# Patient Record
Sex: FEMALE | Race: WHITE | URBAN - METROPOLITAN AREA
[De-identification: names, ages, dates, MRNs, and addresses within clinical notes are randomized per-mention and may not be internally consistent; named-entity substitution may affect disease eponyms.]

---

## 2020-09-24 ENCOUNTER — APPOINTMENT (OUTPATIENT)
Dept: LAB | Age: 25
End: 2020-09-24
Attending: NURSE PRACTITIONER
Payer: COMMERCIAL

## 2020-09-24 ENCOUNTER — OFFICE VISIT (OUTPATIENT)
Dept: FAMILY MEDICINE CLINIC | Facility: CLINIC | Age: 25
End: 2020-09-24
Payer: COMMERCIAL

## 2020-09-24 VITALS
HEIGHT: 63.5 IN | OXYGEN SATURATION: 98 % | HEART RATE: 68 BPM | DIASTOLIC BLOOD PRESSURE: 68 MMHG | BODY MASS INDEX: 18.38 KG/M2 | SYSTOLIC BLOOD PRESSURE: 100 MMHG | RESPIRATION RATE: 17 BRPM | WEIGHT: 105 LBS | TEMPERATURE: 98 F

## 2020-09-24 DIAGNOSIS — R35.1 NOCTURIA: ICD-10-CM

## 2020-09-24 DIAGNOSIS — R00.2 PALPITATIONS: Primary | ICD-10-CM

## 2020-09-24 DIAGNOSIS — R79.89 ELEVATED TSH: ICD-10-CM

## 2020-09-24 DIAGNOSIS — G47.9 SLEEP DISTURBANCES: ICD-10-CM

## 2020-09-24 DIAGNOSIS — G47.61 PLMD (PERIODIC LIMB MOVEMENT DISORDER): ICD-10-CM

## 2020-09-24 DIAGNOSIS — G47.8 SLEEP PARALYSIS: ICD-10-CM

## 2020-09-24 LAB
ALBUMIN SERPL-MCNC: 3.8 G/DL (ref 3.4–5)
ALBUMIN/GLOB SERPL: 1.1 {RATIO} (ref 1–2)
ALP LIVER SERPL-CCNC: 74 U/L
ALT SERPL-CCNC: 36 U/L
ANION GAP SERPL CALC-SCNC: 4 MMOL/L (ref 0–18)
AST SERPL-CCNC: 19 U/L (ref 15–37)
BASOPHILS # BLD AUTO: 0.04 X10(3) UL (ref 0–0.2)
BASOPHILS NFR BLD AUTO: 0.7 %
BILIRUB SERPL-MCNC: 0.4 MG/DL (ref 0.1–2)
BUN BLD-MCNC: 18 MG/DL (ref 7–18)
BUN/CREAT SERPL: 22.2 (ref 10–20)
CALCIUM BLD-MCNC: 8.8 MG/DL (ref 8.5–10.1)
CHLORIDE SERPL-SCNC: 110 MMOL/L (ref 98–112)
CO2 SERPL-SCNC: 25 MMOL/L (ref 21–32)
CREAT BLD-MCNC: 0.81 MG/DL
DEPRECATED HBV CORE AB SER IA-ACNC: 26.5 NG/ML
DEPRECATED RDW RBC AUTO: 37.8 FL (ref 35.1–46.3)
EOSINOPHIL # BLD AUTO: 0.15 X10(3) UL (ref 0–0.7)
EOSINOPHIL NFR BLD AUTO: 2.4 %
ERYTHROCYTE [DISTWIDTH] IN BLOOD BY AUTOMATED COUNT: 12.5 % (ref 11–15)
GLOBULIN PLAS-MCNC: 3.6 G/DL (ref 2.8–4.4)
GLUCOSE BLD-MCNC: 89 MG/DL (ref 70–99)
HAV IGM SER QL: 2.1 MG/DL (ref 1.6–2.6)
HCT VFR BLD AUTO: 39.1 %
HGB BLD-MCNC: 12.6 G/DL
IMM GRANULOCYTES # BLD AUTO: 0.01 X10(3) UL (ref 0–1)
IMM GRANULOCYTES NFR BLD: 0.2 %
IRON SATURATION: 10 %
IRON SERPL-MCNC: 36 UG/DL
LYMPHOCYTES # BLD AUTO: 2.47 X10(3) UL (ref 1–4)
LYMPHOCYTES NFR BLD AUTO: 40.3 %
M PROTEIN MFR SERPL ELPH: 7.4 G/DL (ref 6.4–8.2)
MCH RBC QN AUTO: 27.1 PG (ref 26–34)
MCHC RBC AUTO-ENTMCNC: 32.2 G/DL (ref 31–37)
MCV RBC AUTO: 84.1 FL
MONOCYTES # BLD AUTO: 0.58 X10(3) UL (ref 0.1–1)
MONOCYTES NFR BLD AUTO: 9.5 %
NEUTROPHILS # BLD AUTO: 2.88 X10 (3) UL (ref 1.5–7.7)
NEUTROPHILS # BLD AUTO: 2.88 X10(3) UL (ref 1.5–7.7)
NEUTROPHILS NFR BLD AUTO: 46.9 %
OSMOLALITY SERPL CALC.SUM OF ELEC: 289 MOSM/KG (ref 275–295)
PATIENT FASTING Y/N/NP: NO
PLATELET # BLD AUTO: 224 10(3)UL (ref 150–450)
POTASSIUM SERPL-SCNC: 4.2 MMOL/L (ref 3.5–5.1)
RBC # BLD AUTO: 4.65 X10(6)UL
SODIUM SERPL-SCNC: 139 MMOL/L (ref 136–145)
TOTAL IRON BINDING CAPACITY: 349 UG/DL (ref 240–450)
TRANSFERRIN SERPL-MCNC: 234 MG/DL (ref 200–360)
TSI SER-ACNC: 4.99 MIU/ML (ref 0.36–3.74)
VIT B12 SERPL-MCNC: 359 PG/ML (ref 193–986)
VIT D+METAB SERPL-MCNC: 13 NG/ML (ref 30–100)
WBC # BLD AUTO: 6.1 X10(3) UL (ref 4–11)

## 2020-09-24 PROCEDURE — 84439 ASSAY OF FREE THYROXINE: CPT | Performed by: NURSE PRACTITIONER

## 2020-09-24 PROCEDURE — 93000 ELECTROCARDIOGRAM COMPLETE: CPT | Performed by: NURSE PRACTITIONER

## 2020-09-24 PROCEDURE — 3074F SYST BP LT 130 MM HG: CPT | Performed by: NURSE PRACTITIONER

## 2020-09-24 PROCEDURE — 80050 GENERAL HEALTH PANEL: CPT | Performed by: NURSE PRACTITIONER

## 2020-09-24 PROCEDURE — 82607 VITAMIN B-12: CPT | Performed by: NURSE PRACTITIONER

## 2020-09-24 PROCEDURE — 3008F BODY MASS INDEX DOCD: CPT | Performed by: NURSE PRACTITIONER

## 2020-09-24 PROCEDURE — 3078F DIAST BP <80 MM HG: CPT | Performed by: NURSE PRACTITIONER

## 2020-09-24 PROCEDURE — 82728 ASSAY OF FERRITIN: CPT | Performed by: NURSE PRACTITIONER

## 2020-09-24 PROCEDURE — 99204 OFFICE O/P NEW MOD 45 MIN: CPT | Performed by: NURSE PRACTITIONER

## 2020-09-24 PROCEDURE — 83735 ASSAY OF MAGNESIUM: CPT | Performed by: NURSE PRACTITIONER

## 2020-09-24 PROCEDURE — 83550 IRON BINDING TEST: CPT | Performed by: NURSE PRACTITIONER

## 2020-09-24 PROCEDURE — 83540 ASSAY OF IRON: CPT | Performed by: NURSE PRACTITIONER

## 2020-09-24 PROCEDURE — 36415 COLL VENOUS BLD VENIPUNCTURE: CPT | Performed by: NURSE PRACTITIONER

## 2020-09-24 PROCEDURE — 84480 ASSAY TRIIODOTHYRONINE (T3): CPT | Performed by: NURSE PRACTITIONER

## 2020-09-24 PROCEDURE — 82306 VITAMIN D 25 HYDROXY: CPT | Performed by: NURSE PRACTITIONER

## 2020-09-24 NOTE — PROGRESS NOTES
[facility]        HPI:   This is a 26year old female coming in for Patient presents with:   Other: Heart racing at night , 1 week      HPI:     Patient is present as a new patient to our clinic with her mom for a sle status: Never Smoker      Smokeless tobacco: Never Used    Alcohol use: Not Currently    Drug use: Never    Family History:  Family History   Problem Relation Age of Onset   • Crohn's Disease Mother    • Thyroid disease Father    • Diabetes Maternal Grandm intact distal pulses. Pulmonary/Chest: Effort normal and breath sounds normal. No respiratory distress. Musculoskeletal: Normal range of motion. Lymphadenopathy:     She has no cervical adenopathy.    Neurological: She is alert and oriented to person, PCR([facility]); Future  - VITAMIN D, 25-HYDROXY; Future  - CBC WITH DIFFERENTIAL WITH PLATELET  - COMP METABOLIC PANEL (14)  - FERRITIN  - IRON AND TIBC  - ASSAY, THYROID STIM HORMONE  - VITAMIN B12  - MAGNESIUM  - VITAMIN D, 25-HYDROXY    4.  PLMD (periodic celaya when sleepy. Advised if still with sleep apnea and not using CPAP has a  7 fold increase in risk of heart attack, stroke, abnormal heart rhythm  and death,  increased risk of driving accidents. Advised to refrain from driving when sleepy.     [place]

## 2020-09-24 NOTE — PATIENT INSTRUCTIONS
Labs pending. Order for sleep study will be sent to Sleep Lab at [facility].  They will call to set up an appointment in the next week. If not heard from them, please call them at [phone]. If any problems, please call us at [phone].

## 2020-09-25 DIAGNOSIS — R79.89 ELEVATED TSH: Primary | ICD-10-CM

## 2020-09-25 DIAGNOSIS — R00.2 PALPITATIONS: ICD-10-CM

## 2020-09-25 DIAGNOSIS — E61.1 IRON DEFICIENCY: ICD-10-CM

## 2020-09-25 DIAGNOSIS — E53.8 LOW SERUM VITAMIN B12: ICD-10-CM

## 2020-09-25 DIAGNOSIS — R79.89 LOW VITAMIN D LEVEL: ICD-10-CM

## 2020-09-25 LAB
T3 SERPL-MCNC: 97 NG/DL (ref 60–181)
T4 FREE SERPL-MCNC: 1 NG/DL (ref 0.8–1.7)

## 2020-09-25 RX ORDER — ERGOCALCIFEROL 1.25 MG/1
50000 CAPSULE ORAL WEEKLY
Qty: 12 CAPSULE | Refills: 0 | Status: SHIPPED | OUTPATIENT
Start: 2020-09-25 | End: 2020-09-30

## 2020-09-28 ENCOUNTER — TELEPHONE (OUTPATIENT)
Dept: FAMILY MEDICINE CLINIC | Facility: CLINIC | Age: 25
End: 2020-09-28

## 2020-09-28 DIAGNOSIS — R79.89 ELEVATED TSH: Primary | ICD-10-CM

## 2020-09-28 NOTE — TELEPHONE ENCOUNTER
----- Message from DURAN Arthur sent at 9/25/2020 12:00 PM CDT -----  Labs show an increase in the BUN/creatinine ratio. Need to drink more water. Iron levels are low.   Recommend iron with vitamin C twice a day for 3 months then once a day for 3

## 2020-09-28 NOTE — TELEPHONE ENCOUNTER
----- Message from DURAN Thomas sent at 9/28/2020  8:06 AM CDT -----  T3 and T4 are normal. Recheck TSH with other labs.

## 2020-09-29 ENCOUNTER — PATIENT MESSAGE (OUTPATIENT)
Dept: FAMILY MEDICINE CLINIC | Facility: CLINIC | Age: 25
End: 2020-09-29

## 2020-09-30 RX ORDER — ERGOCALCIFEROL 1.25 MG/1
50000 CAPSULE ORAL WEEKLY
Qty: 12 CAPSULE | Refills: 0 | Status: SHIPPED | OUTPATIENT
Start: 2020-09-30 | End: 2020-12-17

## 2020-09-30 NOTE — TELEPHONE ENCOUNTER
From: Lior Tracy  To: Sharyl Nageotte, APRN  Sent: 9/29/2020 9:31 PM CDT  Subject: Prescription Question    Can you please change my prescription to go to [facility] in [place], not [facility]. I made a mistake. Also i went [facility]i [facility]

## 2020-10-28 ENCOUNTER — TELEPHONE (OUTPATIENT)
Dept: FAMILY MEDICINE CLINIC | Facility: CLINIC | Age: 25
End: 2020-10-28

## 2020-10-28 NOTE — TELEPHONE ENCOUNTER
Please let patient know the insurance denied her in lab sleep study. Please have her schedule home sleep study. Thank you.

## 2020-11-07 ENCOUNTER — PATIENT MESSAGE (OUTPATIENT)
Dept: FAMILY MEDICINE CLINIC | Facility: CLINIC | Age: 25
End: 2020-11-07

## 2020-11-07 ENCOUNTER — TELEPHONE (OUTPATIENT)
Dept: FAMILY MEDICINE CLINIC | Facility: CLINIC | Age: 25
End: 2020-11-07

## 2020-11-07 NOTE — TELEPHONE ENCOUNTER
Mom, Marcia Roberts, called answering service for patient to get order for Covid. Patient was exposed to sister's fiance who was found to be positive today. Courtni needs test to return to work. Denies any sx.    Informed that needs an appointment for evaluation fo

## 2020-11-09 ENCOUNTER — TELEMEDICINE (OUTPATIENT)
Dept: FAMILY MEDICINE CLINIC | Facility: CLINIC | Age: 25
End: 2020-11-09
Payer: COMMERCIAL

## 2020-11-09 VITALS — TEMPERATURE: 99 F

## 2020-11-09 DIAGNOSIS — Z20.822 CLOSE EXPOSURE TO COVID-19 VIRUS: Primary | ICD-10-CM

## 2020-11-09 PROCEDURE — 99212 OFFICE O/P EST SF 10 MIN: CPT | Performed by: FAMILY MEDICINE

## 2020-11-09 RX ORDER — MELATONIN
650
COMMUNITY

## 2020-11-09 RX ORDER — ASCORBIC ACID 500 MG
500 TABLET ORAL DAILY
COMMUNITY

## 2020-11-09 NOTE — TELEPHONE ENCOUNTER
From: Thai Pollard  To: DURAN Reyes  Sent: 11/7/2020 5:01 PM CST  Subject: Other    I need a doctors referral for covid 19 testing. I have been exposed from my household and i need a test for my work and note.

## 2020-11-09 NOTE — PROGRESS NOTES
[address]  PROGRESS NOTE  Chief Complaint:   Patient presents with: Other: Exposure to [place]      HPI:   Lisa Guerrier  verbally consents to a Virtual/Telephone Check-In service on 11/9/2020.     Patient understands and accepts f Total Iron Binding Capacity 349 240 - 450 ug/dL    % Saturation 10 (L) 15 - 50 %   ASSAY, THYROID STIM HORMONE   Result Value Ref Range    TSH 4.990 (H) 0.358 - 3.740 mIU/mL   VITAMIN B12   Result Value Ref Range    Vitamin B12 359 193 - 986 pg/mL   MAG Allergies:  Not on File  Current Meds:  Current Outpatient Medications   Medication Sig Dispense Refill   • ferrous sulfate 325 (65 FE) MG Oral Tab EC Take 325 mg by mouth daily with breakfast.     • Vitamin C 500 MG Oral Tab Take 500 mg by mouth daily the telephone. She was oriented x3. Her affect was appropriate. She did not cough or wheeze on the telephone. ASSESSMENT AND PLAN:   1. Close exposure to COVID-19 virus  Covid exposure without symptoms. Plan: Recommend Covid testing today.   Remain

## 2020-12-17 RX ORDER — ERGOCALCIFEROL 1.25 MG/1
CAPSULE ORAL
Qty: 12 CAPSULE | Refills: 0 | OUTPATIENT
Start: 2020-12-17

## 2020-12-24 ENCOUNTER — PATIENT MESSAGE (OUTPATIENT)
Dept: FAMILY MEDICINE CLINIC | Facility: CLINIC | Age: 25
End: 2020-12-24

## 2020-12-24 NOTE — TELEPHONE ENCOUNTER
From: Viktor Urbina  To: DURAN Parr  Sent: 12/24/2020 8:00 AM CST  Subject: Prescription Question    I ran out of vit d. Can i get refill? Or do i need a blood test first. If so, can i come any time? Or does it need to be scheduled?

## 2021-01-06 ENCOUNTER — LAB ENCOUNTER (OUTPATIENT)
Dept: LAB | Age: 26
End: 2021-01-06
Attending: NURSE PRACTITIONER
Payer: COMMERCIAL

## 2021-01-06 DIAGNOSIS — R79.89 LOW VITAMIN D LEVEL: ICD-10-CM

## 2021-01-06 DIAGNOSIS — R79.89 ELEVATED TSH: ICD-10-CM

## 2021-01-06 DIAGNOSIS — E53.8 LOW SERUM VITAMIN B12: ICD-10-CM

## 2021-01-06 DIAGNOSIS — R00.2 PALPITATIONS: ICD-10-CM

## 2021-01-06 DIAGNOSIS — E61.1 IRON DEFICIENCY: ICD-10-CM

## 2021-01-06 LAB
ALBUMIN SERPL-MCNC: 3.8 G/DL (ref 3.4–5)
ALBUMIN/GLOB SERPL: 1.2 {RATIO} (ref 1–2)
ALP LIVER SERPL-CCNC: 55 U/L
ALT SERPL-CCNC: 55 U/L
ANION GAP SERPL CALC-SCNC: 5 MMOL/L (ref 0–18)
AST SERPL-CCNC: 32 U/L (ref 15–37)
BILIRUB SERPL-MCNC: 0.5 MG/DL (ref 0.1–2)
BUN BLD-MCNC: 17 MG/DL (ref 7–18)
BUN/CREAT SERPL: 18.7 (ref 10–20)
CALCIUM BLD-MCNC: 8.6 MG/DL (ref 8.5–10.1)
CHLORIDE SERPL-SCNC: 107 MMOL/L (ref 98–112)
CO2 SERPL-SCNC: 25 MMOL/L (ref 21–32)
CREAT BLD-MCNC: 0.91 MG/DL
DEPRECATED HBV CORE AB SER IA-ACNC: 48.7 NG/ML
GLOBULIN PLAS-MCNC: 3.3 G/DL (ref 2.8–4.4)
GLUCOSE BLD-MCNC: 83 MG/DL (ref 70–99)
IRON SATURATION: 24 %
IRON SERPL-MCNC: 81 UG/DL
M PROTEIN MFR SERPL ELPH: 7.1 G/DL (ref 6.4–8.2)
OSMOLALITY SERPL CALC.SUM OF ELEC: 285 MOSM/KG (ref 275–295)
PATIENT FASTING Y/N/NP: NO
POTASSIUM SERPL-SCNC: 4.3 MMOL/L (ref 3.5–5.1)
SODIUM SERPL-SCNC: 137 MMOL/L (ref 136–145)
TOTAL IRON BINDING CAPACITY: 332 UG/DL (ref 240–450)
TRANSFERRIN SERPL-MCNC: 223 MG/DL (ref 200–360)
TSI SER-ACNC: 2.26 MIU/ML (ref 0.36–3.74)
VIT B12 SERPL-MCNC: 436 PG/ML (ref 193–986)
VIT D+METAB SERPL-MCNC: 36 NG/ML (ref 30–100)

## 2021-01-06 PROCEDURE — 83540 ASSAY OF IRON: CPT | Performed by: NURSE PRACTITIONER

## 2021-01-06 PROCEDURE — 36415 COLL VENOUS BLD VENIPUNCTURE: CPT | Performed by: NURSE PRACTITIONER

## 2021-01-06 PROCEDURE — 82306 VITAMIN D 25 HYDROXY: CPT | Performed by: NURSE PRACTITIONER

## 2021-01-06 PROCEDURE — 80053 COMPREHEN METABOLIC PANEL: CPT | Performed by: NURSE PRACTITIONER

## 2021-01-06 PROCEDURE — 84443 ASSAY THYROID STIM HORMONE: CPT | Performed by: NURSE PRACTITIONER

## 2021-01-06 PROCEDURE — 82728 ASSAY OF FERRITIN: CPT | Performed by: NURSE PRACTITIONER

## 2021-01-06 PROCEDURE — 83550 IRON BINDING TEST: CPT | Performed by: NURSE PRACTITIONER

## 2021-01-06 PROCEDURE — 82607 VITAMIN B-12: CPT | Performed by: NURSE PRACTITIONER

## 2021-01-07 ENCOUNTER — TELEPHONE (OUTPATIENT)
Dept: FAMILY MEDICINE CLINIC | Facility: CLINIC | Age: 26
End: 2021-01-07

## 2021-01-07 ENCOUNTER — PATIENT MESSAGE (OUTPATIENT)
Dept: FAMILY MEDICINE CLINIC | Facility: CLINIC | Age: 26
End: 2021-01-07

## 2021-01-07 NOTE — TELEPHONE ENCOUNTER
From: Marcia Fraire  To: DURAN Dodge  Sent: 1/7/2021 10:50 AM CST  Subject: Prescription Question    Should i get a refill on vitamin D? If so can you please put one in.   Thank you

## 2021-01-07 NOTE — TELEPHONE ENCOUNTER
----- Message from DURAN Silva sent at 1/7/2021  9:11 AM CST -----  Labs are much improved. Thyroid level is now normal.  B12 is only slightly increased. Make sure she is taking a B complex. Recommend to continue supplements. Note to MyChart.

## 2021-04-16 ENCOUNTER — TELEPHONE (OUTPATIENT)
Dept: FAMILY MEDICINE CLINIC | Facility: CLINIC | Age: 26
End: 2021-04-16

## 2021-04-16 NOTE — TELEPHONE ENCOUNTER
had appointed scheduled for Monday, April 19th, but having symptoms:  jolted for deep breath, heart beat slow then fast  Future Appointments   Date Time Provider Yanira Glasgow   4/19/2021  8:30 AM Lo Deng APRN [facility] [place]

## 2021-04-16 NOTE — TELEPHONE ENCOUNTER
Patient was seen 9/24/20 for palpitations, jerking awake from sleep. Was referred for sleep study at [facility] but was denied at that time.     LM for patient to return call to clarify whether current symptoms are similar to those reported in Sept, or whether th

## 2021-04-19 ENCOUNTER — LAB ENCOUNTER (OUTPATIENT)
Dept: LAB | Age: 26
End: 2021-04-19
Attending: NURSE PRACTITIONER
Payer: COMMERCIAL

## 2021-04-19 ENCOUNTER — OFFICE VISIT (OUTPATIENT)
Dept: FAMILY MEDICINE CLINIC | Facility: CLINIC | Age: 26
End: 2021-04-19
Payer: COMMERCIAL

## 2021-04-19 VITALS
DIASTOLIC BLOOD PRESSURE: 66 MMHG | TEMPERATURE: 97 F | WEIGHT: 106.19 LBS | HEIGHT: 63.75 IN | RESPIRATION RATE: 16 BRPM | OXYGEN SATURATION: 98 % | HEART RATE: 68 BPM | SYSTOLIC BLOOD PRESSURE: 102 MMHG | BODY MASS INDEX: 18.35 KG/M2

## 2021-04-19 DIAGNOSIS — R00.2 PALPITATIONS: Primary | ICD-10-CM

## 2021-04-19 DIAGNOSIS — R35.1 NOCTURIA: ICD-10-CM

## 2021-04-19 DIAGNOSIS — R07.89 OTHER CHEST PAIN: ICD-10-CM

## 2021-04-19 DIAGNOSIS — G47.61 PLMD (PERIODIC LIMB MOVEMENT DISORDER): ICD-10-CM

## 2021-04-19 DIAGNOSIS — G47.9 SLEEP DISTURBANCES: ICD-10-CM

## 2021-04-19 DIAGNOSIS — R53.82 CHRONIC FATIGUE: ICD-10-CM

## 2021-04-19 DIAGNOSIS — G47.8 SLEEP PARALYSIS: ICD-10-CM

## 2021-04-19 PROCEDURE — 83540 ASSAY OF IRON: CPT | Performed by: NURSE PRACTITIONER

## 2021-04-19 PROCEDURE — 83550 IRON BINDING TEST: CPT | Performed by: NURSE PRACTITIONER

## 2021-04-19 PROCEDURE — 36415 COLL VENOUS BLD VENIPUNCTURE: CPT | Performed by: NURSE PRACTITIONER

## 2021-04-19 PROCEDURE — 3078F DIAST BP <80 MM HG: CPT | Performed by: NURSE PRACTITIONER

## 2021-04-19 PROCEDURE — 99214 OFFICE O/P EST MOD 30 MIN: CPT | Performed by: NURSE PRACTITIONER

## 2021-04-19 PROCEDURE — 80053 COMPREHEN METABOLIC PANEL: CPT | Performed by: NURSE PRACTITIONER

## 2021-04-19 PROCEDURE — 85025 COMPLETE CBC W/AUTO DIFF WBC: CPT | Performed by: NURSE PRACTITIONER

## 2021-04-19 PROCEDURE — 83735 ASSAY OF MAGNESIUM: CPT | Performed by: NURSE PRACTITIONER

## 2021-04-19 PROCEDURE — 3008F BODY MASS INDEX DOCD: CPT | Performed by: NURSE PRACTITIONER

## 2021-04-19 PROCEDURE — 3074F SYST BP LT 130 MM HG: CPT | Performed by: NURSE PRACTITIONER

## 2021-04-19 PROCEDURE — 82728 ASSAY OF FERRITIN: CPT | Performed by: NURSE PRACTITIONER

## 2021-04-19 PROCEDURE — 93000 ELECTROCARDIOGRAM COMPLETE: CPT | Performed by: NURSE PRACTITIONER

## 2021-04-19 NOTE — PATIENT INSTRUCTIONS
Scheduled for home sleep study 5/13 at 1 PM.     EKG: no acute findings. Labs pending. Eat frequent small meals. Recheck about 2 weeks after the sleep study to review the results. Otherwise if symptoms recur, go to the ER.

## 2021-04-19 NOTE — PROGRESS NOTES
HPI:    Patient ID: Fede Waterman is a 26year old female. HPI     Patient is present with concern about fatigue and problem sleeping. Is gasping for air. Started a week ago Friday when her period started.  She was at work and got hungry and was t EXAM:      04/19/21  0902   BP: 102/66   Pulse: 68   Resp: 16   Temp: 97.1 °F (36.2 °C)   TempSrc: Tympanic   SpO2: 98%   Weight: 106 lb 3.2 oz (48.2 kg)   Height: 5' 3.75\" (1.619 m)         Body mass index is 18.37 kg/m².       Physical Exam  Constitution encounter diagnosis)  Other chest pain  Chronic fatigue  Sleep paralysis  Sleep disturbances  Plmd (periodic limb movement disorder)  Nocturia    Orders Placed This Encounter      CBC W Differential W Platelet [E]      Comp Metabolic Panel (14) [E]      Fe

## 2021-04-20 ENCOUNTER — TELEPHONE (OUTPATIENT)
Dept: FAMILY MEDICINE CLINIC | Facility: CLINIC | Age: 26
End: 2021-04-20

## 2021-04-20 DIAGNOSIS — R53.82 CHRONIC FATIGUE: ICD-10-CM

## 2021-04-20 DIAGNOSIS — E61.1 LOW SERUM IRON: Primary | ICD-10-CM

## 2021-04-20 NOTE — TELEPHONE ENCOUNTER
----- Message from DURAN Jara sent at 4/20/2021 10:11 AM CDT -----  Labs are all normal. Iron levels are lower than January. Recommend iron supplements with vitamin C twice a day and to consider getting from [facility] to ensure getting

## 2021-10-06 ENCOUNTER — TELEPHONE (OUTPATIENT)
Dept: FAMILY MEDICINE CLINIC | Facility: CLINIC | Age: 26
End: 2021-10-06

## 2021-10-06 NOTE — TELEPHONE ENCOUNTER
Spoke with nini. Patient does not complain of sore throat . Her pain comes when she is yelling. No redness or pus in her throat. Okay for appt.

## 2021-10-06 NOTE — TELEPHONE ENCOUNTER
Answered YES to Covid travel question  Sore Throat  ( reason for appt / vocal cord issues )      Future Appointments   Date Time Provider Yanira Glasgow   10/7/2021  2:00 PM DURAN Gary [facility] [place]

## 2021-10-07 ENCOUNTER — OFFICE VISIT (OUTPATIENT)
Dept: FAMILY MEDICINE CLINIC | Facility: CLINIC | Age: 26
End: 2021-10-07
Payer: COMMERCIAL

## 2021-10-07 VITALS
HEART RATE: 102 BPM | TEMPERATURE: 98 F | WEIGHT: 107 LBS | DIASTOLIC BLOOD PRESSURE: 68 MMHG | SYSTOLIC BLOOD PRESSURE: 100 MMHG | BODY MASS INDEX: 18.49 KG/M2 | HEIGHT: 63.75 IN | OXYGEN SATURATION: 97 % | RESPIRATION RATE: 16 BRPM

## 2021-10-07 DIAGNOSIS — R49.9 CHANGE IN VOICE: Primary | ICD-10-CM

## 2021-10-07 DIAGNOSIS — J02.9 SORE THROAT: ICD-10-CM

## 2021-10-07 PROCEDURE — 99213 OFFICE O/P EST LOW 20 MIN: CPT | Performed by: NURSE PRACTITIONER

## 2021-10-07 PROCEDURE — 3078F DIAST BP <80 MM HG: CPT | Performed by: NURSE PRACTITIONER

## 2021-10-07 PROCEDURE — 3008F BODY MASS INDEX DOCD: CPT | Performed by: NURSE PRACTITIONER

## 2021-10-07 PROCEDURE — 3074F SYST BP LT 130 MM HG: CPT | Performed by: NURSE PRACTITIONER

## 2021-10-07 NOTE — PROGRESS NOTES
HPI:    Patient ID: Nigel Fitzpatrick is a 23year old female. HPI     Patient is present for change in vocal cords. States that she can't sing like before. Is talks more, voice gives out. Yelling is limited.  Not able to scream. States that has a ST be erythema. Eyes:      Conjunctiva/sclera: Conjunctivae normal.   Neck:      Thyroid: No thyromegaly. Cardiovascular:      Rate and Rhythm: Normal rate and regular rhythm. Pulses: Normal pulses. Heart sounds: Normal heart sounds.    Pulmonary: will usually go away in 1 to 2 weeks. Home care  · Rest your voice until it recovers. Talk as little as possible. If your symptoms are severe, rest at home for a day or so. · Moist air may help your symptoms.  Try breathing cool steam from a humidifier o

## 2021-10-07 NOTE — PATIENT INSTRUCTIONS
No screaming. Minimal yelling. Limit talking as much as possible. Referral to ENT, Dr. Prieto Hayes. Make sure he is covered by insurance before going. If not covered, call with who is covered. Start antihistamine such as Claritin, Allegra, or Zyrtec.  D of the following:   · Noisy breathing or trouble breathing  · Drooling or not able to swallow  · Not able to talk  · Feeling dizzy or lightheaded  Aris last reviewed this educational content on 4/1/2020  © 8895-2788 [employer].  All right

## 2021-12-29 ENCOUNTER — TELEPHONE (OUTPATIENT)
Dept: FAMILY MEDICINE CLINIC | Facility: CLINIC | Age: 26
End: 2021-12-29

## 2021-12-30 NOTE — TELEPHONE ENCOUNTER
Patient is having chills and fever, headache, body aches, scratchy throat and nausea. Has taken PCR covid test at external site, awaiting results. Needs video visit in order to get work excuse letter. Scheduled.  Patient aware this visit is charged to insur

## 2022-11-03 ENCOUNTER — TELEMEDICINE (OUTPATIENT)
Dept: FAMILY MEDICINE CLINIC | Facility: CLINIC | Age: 27
End: 2022-11-03

## 2022-11-03 VITALS — HEART RATE: 94 BPM | SYSTOLIC BLOOD PRESSURE: 109 MMHG | TEMPERATURE: 98 F | DIASTOLIC BLOOD PRESSURE: 65 MMHG

## 2022-11-03 DIAGNOSIS — R11.0 NAUSEA: ICD-10-CM

## 2022-11-03 DIAGNOSIS — R51.9 PRESSURE IN HEAD: Primary | ICD-10-CM

## 2022-11-03 DIAGNOSIS — R68.83 CHILLS: ICD-10-CM

## 2022-11-03 LAB — AMB EXT COVID-19 RESULT: NOT DETECTED

## 2022-11-03 PROCEDURE — 99213 OFFICE O/P EST LOW 20 MIN: CPT | Performed by: NURSE PRACTITIONER

## 2022-11-03 RX ORDER — ONDANSETRON 4 MG/1
4 TABLET, FILM COATED ORAL EVERY 8 HOURS PRN
Qty: 20 TABLET | Refills: 0 | Status: SHIPPED | OUTPATIENT
Start: 2022-11-03

## 2022-11-03 NOTE — PATIENT INSTRUCTIONS
Note done for work today. Recommend Zofran for the nausea. Order for COVID/RSV/flu swab at [facility]    Treat symptoms as needed. If any worsening of symptoms, needs to go to the ER.

## 2022-11-04 ENCOUNTER — TELEPHONE (OUTPATIENT)
Dept: FAMILY MEDICINE CLINIC | Facility: CLINIC | Age: 27
End: 2022-11-04

## 2022-11-04 NOTE — TELEPHONE ENCOUNTER
Lab report received from [facility] via fax, patient has negative covid, influenza A and B, and RSV. Entered covid result. Patient notified. Reiterated to patient Ashley Deng's instructions from office notes yesterday. Verbalizes understanding.

## 2022-11-09 ENCOUNTER — OFFICE VISIT (OUTPATIENT)
Dept: FAMILY MEDICINE CLINIC | Facility: CLINIC | Age: 27
End: 2022-11-09

## 2022-11-09 VITALS
HEIGHT: 63.75 IN | OXYGEN SATURATION: 99 % | BODY MASS INDEX: 18.56 KG/M2 | DIASTOLIC BLOOD PRESSURE: 66 MMHG | RESPIRATION RATE: 14 BRPM | SYSTOLIC BLOOD PRESSURE: 112 MMHG | TEMPERATURE: 97 F | WEIGHT: 107.38 LBS | HEART RATE: 86 BPM

## 2022-11-09 DIAGNOSIS — R06.00 PND (PAROXYSMAL NOCTURNAL DYSPNEA): ICD-10-CM

## 2022-11-09 DIAGNOSIS — J35.1 ENLARGED TONSILS: ICD-10-CM

## 2022-11-09 DIAGNOSIS — R35.0 URINARY FREQUENCY: ICD-10-CM

## 2022-11-09 DIAGNOSIS — R51.9 PRESSURE IN HEAD: Primary | ICD-10-CM

## 2022-11-09 LAB
APPEARANCE: CLEAR
BILIRUBIN: NEGATIVE
GLUCOSE (URINE DIPSTICK): NEGATIVE MG/DL
KETONES (URINE DIPSTICK): NEGATIVE MG/DL
LEUKOCYTES: NEGATIVE
MULTISTIX LOT#: NORMAL NUMERIC
NITRITE, URINE: NEGATIVE
OCCULT BLOOD: NEGATIVE
PH, URINE: 7.5 (ref 4.5–8)
PROTEIN (URINE DIPSTICK): NEGATIVE MG/DL
SPECIFIC GRAVITY: 1.02 (ref 1–1.03)
URINE-COLOR: YELLOW
UROBILINOGEN,SEMI-QN: 0.2 MG/DL (ref 0–1.9)

## 2022-11-09 RX ORDER — UBIDECARENONE 75 MG
250 CAPSULE ORAL DAILY
COMMUNITY

## 2022-11-09 NOTE — PATIENT INSTRUCTIONS
Get labs at [address] [place] [address].     Urine normal.     Take antihistamine for the congestion. Follow up as needed.

## 2022-11-10 ENCOUNTER — PATIENT MESSAGE (OUTPATIENT)
Dept: FAMILY MEDICINE CLINIC | Facility: CLINIC | Age: 27
End: 2022-11-10

## 2022-11-10 NOTE — TELEPHONE ENCOUNTER
From: Juaquin Alonzo  To: DURAN Pelayo  Sent: 11/10/2022 2:24 PM CST  Subject: Cbc test results    I wanted to know if you received test results? They sent me a copy. The only thing that was out of range was red blood cell count. Little high 5.23. What should I do about that? Since my white blood cell count is in range, can I be assured that I do not have any sort of infection? I left work early yesterday by ambulance. My work called them. I was feeling a little dizzy. My head and hands and ears went a very numb. I sat to the floor, my heart was pumping fast and breathing fast. I couldn't get my words out and was stuttering and work was worried and called Ambulance, the EMT people said everything was perfect. They took me to hospital any how. I waited in waiting room for about 3 hours. I felt pressure and tension in my muscles and teeth clenched. Numbness went away. It was hard to swallow, I have no insurance and went home with my parents. So I went home. I now an feeling weird in my head, still feeling slight pressure. Slight muscle tension on the sides of my head. Slight burning feeling out of my right ear. My swallowing is better than yesterday, slightly hard to swallow now. I feel my heart area or lungs area, I can't tell, its a sharp random pains in that general area. Don't feel a weak as yesterday.      Please let me know

## 2022-11-10 NOTE — TELEPHONE ENCOUNTER
LM for patient to call back. Unable to find lab results at this time.  Completed at the ER or at [address] [place] [address]?

## 2022-11-11 ENCOUNTER — PATIENT MESSAGE (OUTPATIENT)
Dept: FAMILY MEDICINE CLINIC | Facility: CLINIC | Age: 27
End: 2022-11-11

## 2022-11-11 LAB
ALBUMIN/GLOBULIN RATIO: 1.9 (ref 1–2.5)
ALBUMIN: 4.7 G/DL (ref 3.6–5.1)
ALKALINE PHOSPHATASE: 45 (ref 31–125)
ALT (SGPT): 17 IU/L (ref 6–29)
AST (SGOT): 23 IU/L (ref 10–30)
BASO (ABSOLUTE): 20 X10E3/UL (ref 0–200)
BASOPHILS, %: 0.5 %
BILIRUBIN, TOTAL: 0.6 MG/DL (ref 0.2–1.2)
BUN: 11 (ref 7–25)
CALCIUM: 9.6 (ref 8.6–10.2)
CARBON DIOXIDE: 29 (ref 20–32)
CHLORIDE: 103 (ref 98–110)
CREATININE: 0.8 MG/DL (ref 0.5–0.96)
EOS (ABSOLUTE): 48 X10E3/UL (ref 15–500)
EOSINOPHILS, %: 1.2 %
GLOBULIN: 2.5 (ref 1.9–3.7)
GLOM FILT RATE, EST: 104
HCT: 42.6 % (ref 35–45)
HGB: 14.2 (ref 11.7–15.5)
LYMPHOCYTE %: 40.4 %
LYMPHOCYTE ABSOLUTE: 1616 (ref 850–3900)
MEAN CELL VOLUME: 81.5 (ref 80–100)
MEAN CORPUSCULAR HEMOGLOBIN: 27.2 (ref 27–33)
MEAN CORPUSCULAR HGB CONC: 33.3 (ref 32–36)
MEAN PLATELET VOLUME: 10.4 (ref 7.5–12.5)
MONOCYTES(ABSOLUTE): 260 X10E3/UL (ref 200–950)
MONOCYTES: 6.5 %
NEUTROPHILS (ABSOLUTE): 2056 X10E3/UL (ref 1500–7800)
NEUTROPHILS: 51.4 %
PLT: 202 (ref 140–400)
POTASSIUM: 4.2 (ref 3.5–5.3)
PROTEIN, TOTAL: 7.2 (ref 6.1–8.1)
RED BLOOD COUNT: 5.23 (ref 3.8–5.1)
RED CELL DISTRIBUTION WIDTH: 12.3 % (ref 11–15)
SODIUM: 138 (ref 135–146)
WBC: 4 (ref 3.8–10.8)

## 2022-11-11 NOTE — TELEPHONE ENCOUNTER
From: Denisha Pineda  To: DURAN Guy  Sent: 11/10/2022 2:24 PM CST  Subject: Cbc test results    I wanted to know if you received test results? They sent me a copy. The only thing that was out of range was red blood cell count. Little high 5.23. What should I do about that? Since my white blood cell count is in range, can I be assured that I do not have any sort of infection? I left work early yesterday by ambulance. My work called them. I was feeling a little dizzy. My head and hands and ears went a very numb. I sat to the floor, my heart was pumping fast and breathing fast. I couldn't get my words out and was stuttering and work was worried and called Ambulance, the EMT people said everything was perfect. They took me to hospital any how. I waited in waiting room for about 3 hours. I felt pressure and tension in my muscles and teeth clenched. Numbness went away. It was hard to swallow, I have no insurance and went home with my parents. So I went home. I now an feeling weird in my head, still feeling slight pressure. Slight muscle tension on the sides of my head. Slight burning feeling out of my right ear. My swallowing is better than yesterday, slightly hard to swallow now. I feel my heart area or lungs area, I can't tell, its a sharp random pains in that general area. Don't feel a weak as yesterday.      Please let me know

## 2022-11-11 NOTE — TELEPHONE ENCOUNTER
From: Gita Das  To: DURAN Wheeler  Sent: 11/10/2022 9:41 PM CST  Subject: Copy of test results    Attached is a copy of test results

## 2022-11-15 ENCOUNTER — OFFICE VISIT (OUTPATIENT)
Dept: FAMILY MEDICINE CLINIC | Facility: CLINIC | Age: 27
End: 2022-11-15

## 2022-11-15 VITALS
TEMPERATURE: 98 F | DIASTOLIC BLOOD PRESSURE: 82 MMHG | RESPIRATION RATE: 16 BRPM | HEIGHT: 63.5 IN | WEIGHT: 105.81 LBS | OXYGEN SATURATION: 98 % | HEART RATE: 76 BPM | BODY MASS INDEX: 18.51 KG/M2 | SYSTOLIC BLOOD PRESSURE: 108 MMHG

## 2022-11-15 DIAGNOSIS — M54.2 NECK PAIN: Primary | ICD-10-CM

## 2022-11-15 DIAGNOSIS — R20.2 PARESTHESIA: ICD-10-CM

## 2022-11-15 PROCEDURE — 99214 OFFICE O/P EST MOD 30 MIN: CPT | Performed by: FAMILY MEDICINE

## 2022-11-23 ENCOUNTER — PATIENT MESSAGE (OUTPATIENT)
Dept: FAMILY MEDICINE CLINIC | Facility: CLINIC | Age: 27
End: 2022-11-23

## 2022-11-23 NOTE — TELEPHONE ENCOUNTER
From: Juaquin Alonzo  To: DURAN Pelayo  Sent: 11/23/2022 3:19 PM CST  Subject: FMLA. work papers    I was told that I should have these FMLA papers filled out to protect my job. I will be dropping them off today. If they can be filled out and faxed as soon as possible. And a copy returned to me before 11/29/22. If later I need to know to call for an extension. Thank you.

## 2022-11-28 ENCOUNTER — TELEPHONE (OUTPATIENT)
Dept: FAMILY MEDICINE CLINIC | Facility: CLINIC | Age: 27
End: 2022-11-28

## 2023-01-23 ENCOUNTER — HOSPITAL ENCOUNTER (OUTPATIENT)
Dept: GENERAL RADIOLOGY | Age: 28
Discharge: HOME | End: 2023-01-23
Attending: NURSE PRACTITIONER
Payer: COMMERCIAL

## 2023-01-23 ENCOUNTER — LAB ENCOUNTER (OUTPATIENT)
Dept: LAB | Age: 28
End: 2023-01-23
Attending: NURSE PRACTITIONER
Payer: COMMERCIAL

## 2023-01-23 ENCOUNTER — OFFICE VISIT (OUTPATIENT)
Dept: FAMILY MEDICINE CLINIC | Facility: CLINIC | Age: 28
End: 2023-01-23
Payer: COMMERCIAL

## 2023-01-23 VITALS
HEIGHT: 63.5 IN | HEART RATE: 68 BPM | BODY MASS INDEX: 18.2 KG/M2 | DIASTOLIC BLOOD PRESSURE: 68 MMHG | RESPIRATION RATE: 18 BRPM | TEMPERATURE: 98 F | OXYGEN SATURATION: 98 % | WEIGHT: 104 LBS | SYSTOLIC BLOOD PRESSURE: 100 MMHG

## 2023-01-23 DIAGNOSIS — M54.2 NECK PAIN: Primary | ICD-10-CM

## 2023-01-23 DIAGNOSIS — R51.9 PRESSURE IN HEAD: ICD-10-CM

## 2023-01-23 DIAGNOSIS — R35.0 URINARY FREQUENCY: ICD-10-CM

## 2023-01-23 DIAGNOSIS — R53.83 OTHER FATIGUE: ICD-10-CM

## 2023-01-23 DIAGNOSIS — M54.2 NECK PAIN: ICD-10-CM

## 2023-01-23 LAB
ALBUMIN SERPL-MCNC: 4 G/DL (ref 3.4–5)
ALBUMIN/GLOB SERPL: 1.1 {RATIO} (ref 1–2)
ALP LIVER SERPL-CCNC: 52 U/L
ALT SERPL-CCNC: 23 U/L
ANION GAP SERPL CALC-SCNC: 3 MMOL/L (ref 0–18)
AST SERPL-CCNC: 17 U/L (ref 15–37)
BASOPHILS # BLD AUTO: 0.04 X10(3) UL (ref 0–0.2)
BASOPHILS NFR BLD AUTO: 1 %
BILIRUB SERPL-MCNC: 0.7 MG/DL (ref 0.1–2)
BILIRUB UR QL STRIP.AUTO: NEGATIVE
BUN BLD-MCNC: 16 MG/DL (ref 7–18)
CALCIUM BLD-MCNC: 9.4 MG/DL (ref 8.5–10.1)
CHLORIDE SERPL-SCNC: 108 MMOL/L (ref 98–112)
CHOLEST SERPL-MCNC: 170 MG/DL (ref ?–200)
CK SERPL-CCNC: 69 U/L
CLARITY UR REFRACT.AUTO: CLEAR
CO2 SERPL-SCNC: 25 MMOL/L (ref 21–32)
COLOR UR AUTO: YELLOW
CREAT BLD-MCNC: 0.9 MG/DL
DEPRECATED HBV CORE AB SER IA-ACNC: 56.1 NG/ML
EOSINOPHIL # BLD AUTO: 0.1 X10(3) UL (ref 0–0.7)
EOSINOPHIL NFR BLD AUTO: 2.6 %
ERYTHROCYTE [DISTWIDTH] IN BLOOD BY AUTOMATED COUNT: 11.9 %
FASTING PATIENT LIPID ANSWER: YES
FASTING STATUS PATIENT QL REPORTED: YES
GFR SERPLBLD BASED ON 1.73 SQ M-ARVRAT: 90 ML/MIN/1.73M2 (ref 60–?)
GLOBULIN PLAS-MCNC: 3.6 G/DL (ref 2.8–4.4)
GLUCOSE BLD-MCNC: 89 MG/DL (ref 70–99)
GLUCOSE UR STRIP.AUTO-MCNC: NEGATIVE MG/DL
HCT VFR BLD AUTO: 44.1 %
HDLC SERPL-MCNC: 66 MG/DL (ref 40–59)
HGB BLD-MCNC: 14.3 G/DL
IMM GRANULOCYTES # BLD AUTO: 0.01 X10(3) UL (ref 0–1)
IMM GRANULOCYTES NFR BLD: 0.3 %
IRON SATN MFR SERPL: 16 %
IRON SERPL-MCNC: 54 UG/DL
KETONES UR STRIP.AUTO-MCNC: 20 MG/DL
LDLC SERPL CALC-MCNC: 91 MG/DL (ref ?–100)
LEUKOCYTE ESTERASE UR QL STRIP.AUTO: NEGATIVE
LYMPHOCYTES # BLD AUTO: 1.96 X10(3) UL (ref 1–4)
LYMPHOCYTES NFR BLD AUTO: 50.4 %
MCH RBC QN AUTO: 27.3 PG (ref 26–34)
MCHC RBC AUTO-ENTMCNC: 32.4 G/DL (ref 31–37)
MCV RBC AUTO: 84.2 FL
MONOCYTES # BLD AUTO: 0.3 X10(3) UL (ref 0.1–1)
MONOCYTES NFR BLD AUTO: 7.7 %
NEUTROPHILS # BLD AUTO: 1.48 X10 (3) UL (ref 1.5–7.7)
NEUTROPHILS # BLD AUTO: 1.48 X10(3) UL (ref 1.5–7.7)
NEUTROPHILS NFR BLD AUTO: 38 %
NITRITE UR QL STRIP.AUTO: NEGATIVE
NONHDLC SERPL-MCNC: 104 MG/DL (ref ?–130)
OSMOLALITY SERPL CALC.SUM OF ELEC: 283 MOSM/KG (ref 275–295)
PH UR STRIP.AUTO: 5 [PH] (ref 5–8)
PLATELET # BLD AUTO: 207 10(3)UL (ref 150–450)
POTASSIUM SERPL-SCNC: 4.1 MMOL/L (ref 3.5–5.1)
PROT SERPL-MCNC: 7.6 G/DL (ref 6.4–8.2)
PROT UR STRIP.AUTO-MCNC: NEGATIVE MG/DL
RBC # BLD AUTO: 5.24 X10(6)UL
RBC UR QL AUTO: NEGATIVE
SODIUM SERPL-SCNC: 136 MMOL/L (ref 136–145)
SP GR UR STRIP.AUTO: 1.03 (ref 1–1.03)
T4 FREE SERPL-MCNC: 1 NG/DL (ref 0.8–1.7)
TIBC SERPL-MCNC: 346 UG/DL (ref 240–450)
TRANSFERRIN SERPL-MCNC: 232 MG/DL (ref 200–360)
TRIGL SERPL-MCNC: 70 MG/DL (ref 30–149)
TSI SER-ACNC: 1.56 MIU/ML (ref 0.36–3.74)
UROBILINOGEN UR STRIP.AUTO-MCNC: <2 MG/DL
VIT B12 SERPL-MCNC: 359 PG/ML (ref 193–986)
VIT D+METAB SERPL-MCNC: 15.3 NG/ML (ref 30–100)
VLDLC SERPL CALC-MCNC: 11 MG/DL (ref 0–30)
WBC # BLD AUTO: 3.9 X10(3) UL (ref 4–11)

## 2023-01-23 PROCEDURE — 82728 ASSAY OF FERRITIN: CPT | Performed by: NURSE PRACTITIONER

## 2023-01-23 PROCEDURE — 3008F BODY MASS INDEX DOCD: CPT | Performed by: NURSE PRACTITIONER

## 2023-01-23 PROCEDURE — 72050 X-RAY EXAM NECK SPINE 4/5VWS: CPT | Performed by: NURSE PRACTITIONER

## 2023-01-23 PROCEDURE — 80061 LIPID PANEL: CPT | Performed by: NURSE PRACTITIONER

## 2023-01-23 PROCEDURE — 82306 VITAMIN D 25 HYDROXY: CPT | Performed by: NURSE PRACTITIONER

## 2023-01-23 PROCEDURE — 82607 VITAMIN B-12: CPT | Performed by: NURSE PRACTITIONER

## 2023-01-23 PROCEDURE — 81003 URINALYSIS AUTO W/O SCOPE: CPT | Performed by: NURSE PRACTITIONER

## 2023-01-23 PROCEDURE — 83550 IRON BINDING TEST: CPT | Performed by: NURSE PRACTITIONER

## 2023-01-23 PROCEDURE — 80050 GENERAL HEALTH PANEL: CPT | Performed by: NURSE PRACTITIONER

## 2023-01-23 PROCEDURE — 99214 OFFICE O/P EST MOD 30 MIN: CPT | Performed by: NURSE PRACTITIONER

## 2023-01-23 PROCEDURE — 84443 ASSAY THYROID STIM HORMONE: CPT | Performed by: NURSE PRACTITIONER

## 2023-01-23 PROCEDURE — 3078F DIAST BP <80 MM HG: CPT | Performed by: NURSE PRACTITIONER

## 2023-01-23 PROCEDURE — 84439 ASSAY OF FREE THYROXINE: CPT | Performed by: NURSE PRACTITIONER

## 2023-01-23 PROCEDURE — 83540 ASSAY OF IRON: CPT | Performed by: NURSE PRACTITIONER

## 2023-01-23 PROCEDURE — 3074F SYST BP LT 130 MM HG: CPT | Performed by: NURSE PRACTITIONER

## 2023-01-23 PROCEDURE — 82550 ASSAY OF CK (CPK): CPT | Performed by: NURSE PRACTITIONER

## 2023-01-23 NOTE — PATIENT INSTRUCTIONS
Labs and C-spine pending    If normal, recommend MRI of the head and neck. Will base follow-up on test results.

## 2023-01-25 DIAGNOSIS — E55.9 VITAMIN D DEFICIENCY: Primary | ICD-10-CM

## 2023-01-25 RX ORDER — ERGOCALCIFEROL 1.25 MG/1
50000 CAPSULE ORAL WEEKLY
Qty: 12 CAPSULE | Refills: 0 | Status: SHIPPED | OUTPATIENT
Start: 2023-01-25 | End: 2023-04-13

## 2023-02-01 ENCOUNTER — OFFICE VISIT (OUTPATIENT)
Dept: FAMILY MEDICINE CLINIC | Facility: CLINIC | Age: 28
End: 2023-02-01
Payer: COMMERCIAL

## 2023-02-01 VITALS
HEART RATE: 77 BPM | WEIGHT: 108 LBS | RESPIRATION RATE: 18 BRPM | OXYGEN SATURATION: 97 % | HEIGHT: 63.5 IN | SYSTOLIC BLOOD PRESSURE: 100 MMHG | TEMPERATURE: 98 F | BODY MASS INDEX: 18.9 KG/M2 | DIASTOLIC BLOOD PRESSURE: 70 MMHG

## 2023-02-01 DIAGNOSIS — R13.10 DYSPHAGIA, UNSPECIFIED TYPE: Primary | ICD-10-CM

## 2023-02-01 LAB
CONTROL LINE PRESENT WITH A CLEAR BACKGROUND (YES/NO): YES YES/NO
KIT LOT #: 4010 NUMERIC
STREP GRP A CUL-SCR: NEGATIVE

## 2023-02-01 PROCEDURE — 87880 STREP A ASSAY W/OPTIC: CPT | Performed by: NURSE PRACTITIONER

## 2023-02-01 PROCEDURE — 99213 OFFICE O/P EST LOW 20 MIN: CPT | Performed by: NURSE PRACTITIONER

## 2023-02-01 PROCEDURE — 3008F BODY MASS INDEX DOCD: CPT | Performed by: NURSE PRACTITIONER

## 2023-02-01 PROCEDURE — 3074F SYST BP LT 130 MM HG: CPT | Performed by: NURSE PRACTITIONER

## 2023-02-01 PROCEDURE — 3078F DIAST BP <80 MM HG: CPT | Performed by: NURSE PRACTITIONER

## 2023-02-01 NOTE — PATIENT INSTRUCTIONS
Strep negative. Recommend warm tea with honey or lozenges with honey. If not improving, refer to ENT. Otherwise follow up as needed.

## (undated) NOTE — LETTER
20    Re:  Rachell Saldaña  :  1995    Dear Juani Durbin has had close exposure to COVID. She will be tested today. She should remain in isolation until the results of her testing are available.     Sincerely,      Ron Dumont

## (undated) NOTE — LETTER
Date: 11/11/2022    Patient Name: Octavio Hwang          To Whom it may concern: This letter has been written at the patient's request. The above patient was seen at the [facility] for treatment of a medical condition. This patient should be excused from attending work  from 11/10  through 11/15. The patient may return to work on 11/16.         Sincerely,      DURAN Alvarenga

## (undated) NOTE — LETTER
To Whom It May Concern:    Neftaly Rene has been under our care regarding ongoing medical issues. Because of this, she has been required to restrict her physical activities. She may resume her usual activities, including work, on 11/15/2022  with the following restrictions:    []  None     []    No heavy lifting (over 15 pounds) for               weeks   [x]    Part-time (no more than          20   hours per week) for        2       week   []  Other:        Please feel free to contact us if there are any questions.       Sincerely,      Sonny Espitia MD  [address] [facility]  [phone]        Document generated by:  Sonny Espitia MD

## (undated) NOTE — LETTER
Date: 11/3/2022    Patient Name: Sue Pedersen          To Whom it may concern: This letter has been written at the patient's request. The above patient was seen at the [facility] for treatment of a medical condition. This patient should be excused from attending work today. The patient may return to work on 11/4 if without fever for at least 24 hours.        Sincerely,      DURAN Orr